# Patient Record
Sex: FEMALE | Race: WHITE | NOT HISPANIC OR LATINO | URBAN - METROPOLITAN AREA
[De-identification: names, ages, dates, MRNs, and addresses within clinical notes are randomized per-mention and may not be internally consistent; named-entity substitution may affect disease eponyms.]

---

## 2017-04-11 ENCOUNTER — IMPORTED ENCOUNTER (OUTPATIENT)
Dept: URBAN - METROPOLITAN AREA CLINIC 59 | Facility: CLINIC | Age: 77
End: 2017-04-11

## 2017-04-11 PROBLEM — H35.82 RETINAL ISCHEMIA: Noted: 2017-04-11

## 2017-04-11 PROBLEM — H43.12 VITREOUS HEMORRHAGE, LEFT EYE: Noted: 2017-04-11

## 2017-04-11 PROBLEM — H35.373 PUCKERING OF MACULA, BILATERAL: Noted: 2017-04-11

## 2017-04-11 PROBLEM — E11.3513 TYPE 2 DIABETES W/ PROLIFERATIVE DIABETIC RETINOPATHY W/ MACULAR EDEMA OF BILATERAL EYES: Noted: 2017-04-11

## 2017-04-11 PROCEDURE — 92014 COMPRE OPH EXAM EST PT 1/>: CPT

## 2017-04-11 PROCEDURE — 92235 FLUORESCEIN ANGRPH MLTIFRAME: CPT

## 2017-04-11 PROCEDURE — 92134 CPTRZ OPH DX IMG PST SGM RTA: CPT

## 2018-02-14 ENCOUNTER — FOLLOW UP (OUTPATIENT)
Age: 78
End: 2018-02-14

## 2018-02-14 DIAGNOSIS — Z96.1: ICD-10-CM

## 2018-02-14 DIAGNOSIS — E11.3211: ICD-10-CM

## 2018-02-14 DIAGNOSIS — E11.3512: ICD-10-CM

## 2018-02-14 PROCEDURE — 92250 FUNDUS PHOTOGRAPHY W/I&R: CPT

## 2018-02-14 PROCEDURE — 92014 COMPRE OPH EXAM EST PT 1/>: CPT

## 2018-02-14 PROCEDURE — 92134 CPTRZ OPH DX IMG PST SGM RTA: CPT

## 2018-02-14 ASSESSMENT — TONOMETRY
OS_IOP_MMHG: 20
OD_IOP_MMHG: 16

## 2018-02-14 ASSESSMENT — VISUAL ACUITY
OD_SC: 20/20-2
OS_SC: 20/30-2

## 2018-04-24 ENCOUNTER — UNSCHEDULED FOLLOW UP (OUTPATIENT)
Age: 78
End: 2018-04-24

## 2018-04-24 DIAGNOSIS — H43.12: ICD-10-CM

## 2018-04-24 DIAGNOSIS — E11.3211: ICD-10-CM

## 2018-04-24 DIAGNOSIS — E11.3512: ICD-10-CM

## 2018-04-24 PROCEDURE — 92014 COMPRE OPH EXAM EST PT 1/>: CPT

## 2018-04-24 ASSESSMENT — VISUAL ACUITY
OS_SC: CF 2FT
OD_SC: 20/20-2

## 2018-04-24 ASSESSMENT — TONOMETRY
OD_IOP_MMHG: 12
OS_IOP_MMHG: 14

## 2018-05-09 ENCOUNTER — FOLLOW UP (OUTPATIENT)
Age: 78
End: 2018-05-09

## 2018-05-09 DIAGNOSIS — E11.3211: ICD-10-CM

## 2018-05-09 DIAGNOSIS — E11.3512: ICD-10-CM

## 2018-05-09 DIAGNOSIS — H43.12: ICD-10-CM

## 2018-05-09 PROCEDURE — 92012 INTRM OPH EXAM EST PATIENT: CPT

## 2018-05-09 PROCEDURE — 76512 OPH US DX B-SCAN: CPT

## 2018-05-09 ASSESSMENT — TONOMETRY
OD_IOP_MMHG: 15
OS_IOP_MMHG: 14

## 2018-05-09 ASSESSMENT — VISUAL ACUITY
OS_SC: 20/150
OD_SC: 20/20-2

## 2018-06-13 ENCOUNTER — FOLLOW UP (OUTPATIENT)
Age: 78
End: 2018-06-13

## 2018-06-13 DIAGNOSIS — H43.12: ICD-10-CM

## 2018-06-13 DIAGNOSIS — E11.3512: ICD-10-CM

## 2018-06-13 DIAGNOSIS — E11.3211: ICD-10-CM

## 2018-06-13 PROCEDURE — 92014 COMPRE OPH EXAM EST PT 1/>: CPT

## 2018-06-13 ASSESSMENT — VISUAL ACUITY: OS_SC: 20/70-1

## 2018-06-13 ASSESSMENT — TONOMETRY: OS_IOP_MMHG: 14

## 2018-07-31 ENCOUNTER — FOLLOW UP (OUTPATIENT)
Age: 78
End: 2018-07-31

## 2018-07-31 DIAGNOSIS — E11.3512: ICD-10-CM

## 2018-07-31 DIAGNOSIS — H43.12: ICD-10-CM

## 2018-07-31 DIAGNOSIS — E11.3211: ICD-10-CM

## 2018-07-31 PROCEDURE — 92134 CPTRZ OPH DX IMG PST SGM RTA: CPT

## 2018-07-31 PROCEDURE — 92012 INTRM OPH EXAM EST PATIENT: CPT

## 2018-07-31 ASSESSMENT — VISUAL ACUITY
OD_SC: 20/20-1
OS_SC: 20/40-2

## 2018-07-31 ASSESSMENT — TONOMETRY
OS_IOP_MMHG: 15
OD_IOP_MMHG: 13

## 2018-08-22 ENCOUNTER — UNSCHEDULED FOLLOW UP (OUTPATIENT)
Age: 78
End: 2018-08-22

## 2018-08-22 DIAGNOSIS — E11.3211: ICD-10-CM

## 2018-08-22 DIAGNOSIS — H43.12: ICD-10-CM

## 2018-08-22 DIAGNOSIS — E11.3512: ICD-10-CM

## 2018-08-22 DIAGNOSIS — Z96.1: ICD-10-CM

## 2018-08-22 PROCEDURE — 67028 INJECTION EYE DRUG: CPT

## 2018-08-22 PROCEDURE — 92014 COMPRE OPH EXAM EST PT 1/>: CPT | Mod: 25

## 2018-08-22 ASSESSMENT — VISUAL ACUITY
OS_SC: CF 1FT
OD_SC: 20/25+2

## 2018-08-22 ASSESSMENT — TONOMETRY: OS_IOP_MMHG: 13

## 2018-10-02 ENCOUNTER — FOLLOW UP (OUTPATIENT)
Age: 78
End: 2018-10-02

## 2018-10-02 DIAGNOSIS — E11.3512: ICD-10-CM

## 2018-10-02 DIAGNOSIS — Z96.1: ICD-10-CM

## 2018-10-02 DIAGNOSIS — H43.12: ICD-10-CM

## 2018-10-02 DIAGNOSIS — E11.3211: ICD-10-CM

## 2018-10-02 PROCEDURE — 92134 CPTRZ OPH DX IMG PST SGM RTA: CPT

## 2018-10-02 PROCEDURE — 67028 INJECTION EYE DRUG: CPT

## 2018-10-02 PROCEDURE — 92012 INTRM OPH EXAM EST PATIENT: CPT | Mod: 25

## 2018-10-02 ASSESSMENT — VISUAL ACUITY
OD_SC: 20/20+2
OS_SC: 20/100

## 2018-10-02 ASSESSMENT — TONOMETRY
OS_IOP_MMHG: 19
OD_IOP_MMHG: 15

## 2018-11-14 ENCOUNTER — FOLLOW UP (OUTPATIENT)
Age: 78
End: 2018-11-14

## 2018-11-14 DIAGNOSIS — E11.3512: ICD-10-CM

## 2018-11-14 DIAGNOSIS — E11.3211: ICD-10-CM

## 2018-11-14 DIAGNOSIS — H43.12: ICD-10-CM

## 2018-11-14 PROCEDURE — 92134 CPTRZ OPH DX IMG PST SGM RTA: CPT

## 2018-11-14 PROCEDURE — 92014 COMPRE OPH EXAM EST PT 1/>: CPT

## 2018-11-14 ASSESSMENT — VISUAL ACUITY
OS_SC: 20/30+1
OD_SC: 20/20-2

## 2018-11-14 ASSESSMENT — TONOMETRY
OD_IOP_MMHG: 17
OS_IOP_MMHG: 15

## 2019-01-22 ENCOUNTER — FOLLOW UP (OUTPATIENT)
Age: 79
End: 2019-01-22

## 2019-01-22 DIAGNOSIS — E11.3512: ICD-10-CM

## 2019-01-22 DIAGNOSIS — H43.12: ICD-10-CM

## 2019-01-22 DIAGNOSIS — E11.3211: ICD-10-CM

## 2019-01-22 PROCEDURE — 92134 CPTRZ OPH DX IMG PST SGM RTA: CPT

## 2019-01-22 PROCEDURE — 92014 COMPRE OPH EXAM EST PT 1/>: CPT

## 2019-01-22 ASSESSMENT — TONOMETRY
OS_IOP_MMHG: 17
OD_IOP_MMHG: 18

## 2019-01-22 ASSESSMENT — VISUAL ACUITY
OD_SC: 20/25+3
OS_SC: 20/30+1

## 2019-02-19 ENCOUNTER — FOLLOW UP (OUTPATIENT)
Age: 79
End: 2019-02-19

## 2019-02-19 DIAGNOSIS — E11.3211: ICD-10-CM

## 2019-02-19 DIAGNOSIS — H43.12: ICD-10-CM

## 2019-02-19 DIAGNOSIS — E11.3512: ICD-10-CM

## 2019-02-19 PROCEDURE — 92012 INTRM OPH EXAM EST PATIENT: CPT | Mod: 25

## 2019-02-19 PROCEDURE — 67228 TREATMENT X10SV RETINOPATHY: CPT

## 2019-02-19 PROCEDURE — 92134 CPTRZ OPH DX IMG PST SGM RTA: CPT

## 2019-02-19 ASSESSMENT — VISUAL ACUITY
OS_SC: 20/30+2
OD_SC: 20/20

## 2019-02-19 ASSESSMENT — TONOMETRY
OD_IOP_MMHG: 16
OS_IOP_MMHG: 17

## 2019-04-16 ENCOUNTER — FOLLOW UP (OUTPATIENT)
Age: 79
End: 2019-04-16

## 2019-04-16 DIAGNOSIS — E11.3211: ICD-10-CM

## 2019-04-16 DIAGNOSIS — H43.12: ICD-10-CM

## 2019-04-16 DIAGNOSIS — E11.3512: ICD-10-CM

## 2019-04-16 PROCEDURE — 92014 COMPRE OPH EXAM EST PT 1/>: CPT

## 2019-04-16 PROCEDURE — 92134 CPTRZ OPH DX IMG PST SGM RTA: CPT

## 2019-04-16 ASSESSMENT — TONOMETRY
OS_IOP_MMHG: 15
OD_IOP_MMHG: 14

## 2019-04-16 ASSESSMENT — VISUAL ACUITY
OS_SC: 20/30-1
OD_SC: 20/20-2

## 2019-07-12 ENCOUNTER — FOLLOW UP (OUTPATIENT)
Age: 79
End: 2019-07-12

## 2019-07-12 DIAGNOSIS — E11.3512: ICD-10-CM

## 2019-07-12 DIAGNOSIS — E11.3211: ICD-10-CM

## 2019-07-12 DIAGNOSIS — H43.12: ICD-10-CM

## 2019-07-12 PROCEDURE — 92014 COMPRE OPH EXAM EST PT 1/>: CPT

## 2019-07-12 PROCEDURE — 92134 CPTRZ OPH DX IMG PST SGM RTA: CPT

## 2019-07-12 ASSESSMENT — TONOMETRY
OD_IOP_MMHG: 14
OS_IOP_MMHG: 17

## 2019-07-12 ASSESSMENT — VISUAL ACUITY
OS_SC: 20/30-2
OD_SC: 20/20-2

## 2019-11-01 ENCOUNTER — FOLLOW UP (OUTPATIENT)
Age: 79
End: 2019-11-01

## 2019-11-01 DIAGNOSIS — E11.3512: ICD-10-CM

## 2019-11-01 DIAGNOSIS — H43.12: ICD-10-CM

## 2019-11-01 DIAGNOSIS — Z96.1: ICD-10-CM

## 2019-11-01 DIAGNOSIS — E11.3211: ICD-10-CM

## 2019-11-01 PROCEDURE — 92134 CPTRZ OPH DX IMG PST SGM RTA: CPT

## 2019-11-01 PROCEDURE — 92014 COMPRE OPH EXAM EST PT 1/>: CPT

## 2019-11-01 ASSESSMENT — VISUAL ACUITY
OD_SC: 20/20
OS_SC: 20/30-2

## 2019-11-01 ASSESSMENT — TONOMETRY
OS_IOP_MMHG: 23
OD_IOP_MMHG: 19

## 2020-02-21 ENCOUNTER — FOLLOW UP (OUTPATIENT)
Age: 80
End: 2020-02-21

## 2020-02-21 DIAGNOSIS — H43.12: ICD-10-CM

## 2020-02-21 DIAGNOSIS — E11.3512: ICD-10-CM

## 2020-02-21 DIAGNOSIS — E11.3211: ICD-10-CM

## 2020-02-21 PROCEDURE — 92134 CPTRZ OPH DX IMG PST SGM RTA: CPT

## 2020-02-21 PROCEDURE — 92014 COMPRE OPH EXAM EST PT 1/>: CPT

## 2020-02-21 ASSESSMENT — VISUAL ACUITY
OS_SC: 20/25
OD_SC: 20/20

## 2020-02-21 ASSESSMENT — TONOMETRY
OS_IOP_MMHG: 14
OD_IOP_MMHG: 14

## 2020-07-22 ENCOUNTER — FOLLOW UP (OUTPATIENT)
Age: 80
End: 2020-07-22

## 2020-07-22 DIAGNOSIS — E11.3211: ICD-10-CM

## 2020-07-22 DIAGNOSIS — H43.12: ICD-10-CM

## 2020-07-22 DIAGNOSIS — E11.3512: ICD-10-CM

## 2020-07-22 PROCEDURE — 92134 CPTRZ OPH DX IMG PST SGM RTA: CPT

## 2020-07-22 PROCEDURE — 92014 COMPRE OPH EXAM EST PT 1/>: CPT

## 2020-07-22 ASSESSMENT — TONOMETRY
OD_IOP_MMHG: 10
OS_IOP_MMHG: 12

## 2020-07-22 ASSESSMENT — VISUAL ACUITY
OS_SC: 20/25-1
OD_SC: 20/20-1

## 2021-04-27 ENCOUNTER — FOLLOW UP (OUTPATIENT)
Age: 81
End: 2021-04-27

## 2021-04-27 DIAGNOSIS — E11.3512: ICD-10-CM

## 2021-04-27 DIAGNOSIS — H43.12: ICD-10-CM

## 2021-04-27 DIAGNOSIS — E11.3211: ICD-10-CM

## 2021-04-27 PROCEDURE — 92014 COMPRE OPH EXAM EST PT 1/>: CPT

## 2021-04-27 PROCEDURE — 92134 CPTRZ OPH DX IMG PST SGM RTA: CPT

## 2021-04-27 ASSESSMENT — VISUAL ACUITY
OD_SC: 20/20
OS_SC: 20/40

## 2021-04-27 ASSESSMENT — TONOMETRY
OD_IOP_MMHG: 17
OS_IOP_MMHG: 15

## 2021-12-14 ENCOUNTER — APPOINTMENT (RX ONLY)
Dept: URBAN - METROPOLITAN AREA CLINIC 348 | Facility: CLINIC | Age: 81
Setting detail: DERMATOLOGY
End: 2021-12-14

## 2021-12-14 DIAGNOSIS — L82.0 INFLAMED SEBORRHEIC KERATOSIS: ICD-10-CM

## 2021-12-14 DIAGNOSIS — L82.1 OTHER SEBORRHEIC KERATOSIS: ICD-10-CM

## 2021-12-14 DIAGNOSIS — L81.4 OTHER MELANIN HYPERPIGMENTATION: ICD-10-CM

## 2021-12-14 PROCEDURE — ? LIQUID NITROGEN

## 2021-12-14 PROCEDURE — 17110 DESTRUCTION B9 LES UP TO 14: CPT

## 2021-12-14 PROCEDURE — ? COUNSELING

## 2021-12-14 PROCEDURE — 99203 OFFICE O/P NEW LOW 30 MIN: CPT | Mod: 25

## 2021-12-14 ASSESSMENT — LOCATION DETAILED DESCRIPTION DERM
LOCATION DETAILED: PERIUMBILICAL SKIN
LOCATION DETAILED: RIGHT SUPERIOR UPPER BACK
LOCATION DETAILED: RIGHT INFERIOR LATERAL MIDBACK
LOCATION DETAILED: LEFT INFERIOR UPPER BACK

## 2021-12-14 ASSESSMENT — LOCATION SIMPLE DESCRIPTION DERM
LOCATION SIMPLE: LEFT UPPER BACK
LOCATION SIMPLE: RIGHT LOWER BACK
LOCATION SIMPLE: RIGHT UPPER BACK
LOCATION SIMPLE: ABDOMEN

## 2021-12-14 ASSESSMENT — LOCATION ZONE DERM: LOCATION ZONE: TRUNK

## 2021-12-14 NOTE — PROCEDURE: LIQUID NITROGEN
Show Applicator Variable?: Yes
Include Z78.9 (Other Specified Conditions Influencing Health Status) As An Associated Diagnosis?: No
Detail Level: Detailed
Medical Necessity Clause: This procedure was medically necessary because the lesions that were treated were:
Post-Care Instructions: I reviewed with the patient in detail post-care instructions. Patient is to wear sunprotection, and avoid picking at any of the treated lesions. Pt may apply Vaseline to crusted or scabbing areas.
Medical Necessity Information: It is in your best interest to select a reason for this procedure from the list below. All of these items fulfill various CMS LCD requirements except the new and changing color options.
Consent: The patient's consent was obtained including but not limited to risks of crusting, scabbing, blistering, scarring, darker or lighter pigmentary change, recurrence, incomplete removal and infection.

## 2022-02-16 ENCOUNTER — APPOINTMENT (RX ONLY)
Dept: URBAN - METROPOLITAN AREA CLINIC 341 | Facility: CLINIC | Age: 82
Setting detail: DERMATOLOGY
End: 2022-02-16

## 2022-02-16 DIAGNOSIS — L82.0 INFLAMED SEBORRHEIC KERATOSIS: ICD-10-CM

## 2022-02-16 PROCEDURE — ? LIQUID NITROGEN

## 2022-02-16 PROCEDURE — ? COUNSELING

## 2022-02-16 PROCEDURE — 17110 DESTRUCTION B9 LES UP TO 14: CPT

## 2022-02-16 ASSESSMENT — LOCATION SIMPLE DESCRIPTION DERM: LOCATION SIMPLE: ABDOMEN

## 2022-02-16 ASSESSMENT — LOCATION ZONE DERM: LOCATION ZONE: TRUNK

## 2022-02-16 ASSESSMENT — LOCATION DETAILED DESCRIPTION DERM: LOCATION DETAILED: PERIUMBILICAL SKIN

## 2022-02-16 NOTE — PROCEDURE: LIQUID NITROGEN
Add 52 Modifier (Optional): no
Show Applicator Variable?: Yes
Medical Necessity Information: It is in your best interest to select a reason for this procedure from the list below. All of these items fulfill various CMS LCD requirements except the new and changing color options.
Consent: The patient's consent was obtained including but not limited to risks of crusting, scabbing, blistering, scarring, darker or lighter pigmentary change, recurrence, incomplete removal and infection.
Medical Necessity Clause: This procedure was medically necessary because the lesions that were treated were:
Post-Care Instructions: I reviewed with the patient in detail post-care instructions. Patient is to wear sunprotection, and avoid picking at any of the treated lesions. Pt may apply Vaseline to crusted or scabbing areas.
Detail Level: Detailed
Spray Paint Text: The liquid nitrogen was applied to the skin utilizing a spray paint frosting technique.

## 2022-05-10 ENCOUNTER — UNSCHEDULED FOLLOW UP (OUTPATIENT)
Dept: URBAN - METROPOLITAN AREA CLINIC 78 | Facility: CLINIC | Age: 82
End: 2022-05-10

## 2022-05-10 DIAGNOSIS — E11.3211: ICD-10-CM

## 2022-05-10 DIAGNOSIS — H43.12: ICD-10-CM

## 2022-05-10 DIAGNOSIS — E11.3512: ICD-10-CM

## 2022-05-10 PROCEDURE — 99214 OFFICE O/P EST MOD 30 MIN: CPT | Mod: 25

## 2022-05-10 PROCEDURE — 67028 INJECTION EYE DRUG: CPT

## 2022-05-10 PROCEDURE — 92134 CPTRZ OPH DX IMG PST SGM RTA: CPT

## 2022-05-10 ASSESSMENT — TONOMETRY
OD_IOP_MMHG: 12
OS_IOP_MMHG: 14

## 2022-05-10 ASSESSMENT — VISUAL ACUITY
OD_SC: 20/20-2
OS_SC: CF 2FT

## 2022-06-24 ENCOUNTER — FOLLOW UP (OUTPATIENT)
Dept: URBAN - METROPOLITAN AREA CLINIC 78 | Facility: CLINIC | Age: 82
End: 2022-06-24

## 2022-06-24 DIAGNOSIS — E11.3512: ICD-10-CM

## 2022-06-24 DIAGNOSIS — H43.12: ICD-10-CM

## 2022-06-24 DIAGNOSIS — E11.3211: ICD-10-CM

## 2022-06-24 PROCEDURE — 92014 COMPRE OPH EXAM EST PT 1/>: CPT | Mod: 25

## 2022-06-24 PROCEDURE — 67028 INJECTION EYE DRUG: CPT

## 2022-06-24 PROCEDURE — 92134 CPTRZ OPH DX IMG PST SGM RTA: CPT

## 2022-06-24 ASSESSMENT — TONOMETRY
OD_IOP_MMHG: 11
OS_IOP_MMHG: 13

## 2022-06-24 ASSESSMENT — VISUAL ACUITY
OD_SC: 20/20-2
OS_SC: CF 2FT

## 2022-07-29 ENCOUNTER — FOLLOW UP (OUTPATIENT)
Dept: URBAN - METROPOLITAN AREA CLINIC 78 | Facility: CLINIC | Age: 82
End: 2022-07-29

## 2022-07-29 DIAGNOSIS — E11.3211: ICD-10-CM

## 2022-07-29 DIAGNOSIS — E11.3512: ICD-10-CM

## 2022-07-29 DIAGNOSIS — H43.12: ICD-10-CM

## 2022-07-29 PROCEDURE — 99213 OFFICE O/P EST LOW 20 MIN: CPT

## 2022-07-29 ASSESSMENT — VISUAL ACUITY: OD_CC: 20/20-1

## 2022-07-29 ASSESSMENT — TONOMETRY
OD_IOP_MMHG: 19
OS_IOP_MMHG: 17

## 2022-08-26 ENCOUNTER — FOLLOW UP (OUTPATIENT)
Dept: URBAN - METROPOLITAN AREA CLINIC 78 | Facility: CLINIC | Age: 82
End: 2022-08-26

## 2022-08-26 DIAGNOSIS — H43.12: ICD-10-CM

## 2022-08-26 DIAGNOSIS — E11.3512: ICD-10-CM

## 2022-08-26 DIAGNOSIS — E11.3211: ICD-10-CM

## 2022-08-26 PROCEDURE — 92014 COMPRE OPH EXAM EST PT 1/>: CPT | Mod: 25

## 2022-08-26 PROCEDURE — 92134 CPTRZ OPH DX IMG PST SGM RTA: CPT

## 2022-08-26 PROCEDURE — 67028 INJECTION EYE DRUG: CPT

## 2022-08-26 ASSESSMENT — TONOMETRY
OD_IOP_MMHG: 14
OS_IOP_MMHG: 15

## 2022-08-26 ASSESSMENT — VISUAL ACUITY
OS_SC: 20/200
OD_SC: 20/20

## 2022-10-04 ENCOUNTER — FOLLOW UP (OUTPATIENT)
Dept: URBAN - METROPOLITAN AREA CLINIC 78 | Facility: CLINIC | Age: 82
End: 2022-10-04

## 2022-10-04 DIAGNOSIS — E11.3512: ICD-10-CM

## 2022-10-04 DIAGNOSIS — H43.12: ICD-10-CM

## 2022-10-04 DIAGNOSIS — E11.3211: ICD-10-CM

## 2022-10-04 DIAGNOSIS — Z96.1: ICD-10-CM

## 2022-10-04 PROCEDURE — 67028 INJECTION EYE DRUG: CPT

## 2022-10-04 PROCEDURE — 99214 OFFICE O/P EST MOD 30 MIN: CPT | Mod: 25

## 2022-10-04 PROCEDURE — 92134 CPTRZ OPH DX IMG PST SGM RTA: CPT

## 2022-10-04 ASSESSMENT — VISUAL ACUITY
OS_SC: 20/40-2
OD_SC: 20/20-1

## 2022-10-04 ASSESSMENT — TONOMETRY
OD_IOP_MMHG: 13
OS_IOP_MMHG: 13

## 2022-11-15 ENCOUNTER — FOLLOW UP (OUTPATIENT)
Dept: URBAN - METROPOLITAN AREA CLINIC 78 | Facility: CLINIC | Age: 82
End: 2022-11-15

## 2022-11-15 DIAGNOSIS — H43.12: ICD-10-CM

## 2022-11-15 DIAGNOSIS — E11.3512: ICD-10-CM

## 2022-11-15 DIAGNOSIS — E11.3211: ICD-10-CM

## 2022-11-15 PROCEDURE — 92014 COMPRE OPH EXAM EST PT 1/>: CPT | Mod: 25

## 2022-11-15 PROCEDURE — 67028 INJECTION EYE DRUG: CPT

## 2022-11-15 PROCEDURE — 92134 CPTRZ OPH DX IMG PST SGM RTA: CPT

## 2022-11-15 ASSESSMENT — VISUAL ACUITY
OD_SC: 20/20-2
OS_SC: 20/40-1

## 2022-11-15 ASSESSMENT — TONOMETRY
OS_IOP_MMHG: 09
OD_IOP_MMHG: 09

## 2023-01-10 ENCOUNTER — FOLLOW UP (OUTPATIENT)
Dept: URBAN - METROPOLITAN AREA CLINIC 78 | Facility: CLINIC | Age: 83
End: 2023-01-10

## 2023-01-10 DIAGNOSIS — E11.3512: ICD-10-CM

## 2023-01-10 DIAGNOSIS — E11.3211: ICD-10-CM

## 2023-01-10 DIAGNOSIS — H43.12: ICD-10-CM

## 2023-01-10 PROCEDURE — 92014 COMPRE OPH EXAM EST PT 1/>: CPT

## 2023-01-10 PROCEDURE — 92134 CPTRZ OPH DX IMG PST SGM RTA: CPT

## 2023-01-10 ASSESSMENT — VISUAL ACUITY
OS_SC: 20/30-1
OD_SC: 20/20-1

## 2023-01-10 ASSESSMENT — TONOMETRY
OS_IOP_MMHG: 17
OD_IOP_MMHG: 14

## 2023-03-03 ENCOUNTER — FOLLOW UP (OUTPATIENT)
Dept: URBAN - METROPOLITAN AREA CLINIC 78 | Facility: CLINIC | Age: 83
End: 2023-03-03

## 2023-03-03 DIAGNOSIS — H43.12: ICD-10-CM

## 2023-03-03 DIAGNOSIS — E11.3211: ICD-10-CM

## 2023-03-03 DIAGNOSIS — E11.3512: ICD-10-CM

## 2023-03-03 PROCEDURE — 92014 COMPRE OPH EXAM EST PT 1/>: CPT

## 2023-03-03 PROCEDURE — 92134 CPTRZ OPH DX IMG PST SGM RTA: CPT

## 2023-03-03 ASSESSMENT — TONOMETRY
OD_IOP_MMHG: 10
OS_IOP_MMHG: 12

## 2023-03-03 ASSESSMENT — VISUAL ACUITY
OS_SC: 20/30-1
OD_SC: 20/25-1

## 2023-03-13 ENCOUNTER — APPOINTMENT (RX ONLY)
Dept: URBAN - METROPOLITAN AREA CLINIC 341 | Facility: CLINIC | Age: 83
Setting detail: DERMATOLOGY
End: 2023-03-13

## 2023-03-13 DIAGNOSIS — L72.0 EPIDERMAL CYST: ICD-10-CM | Status: INADEQUATELY CONTROLLED

## 2023-03-13 PROCEDURE — ? COUNSELING

## 2023-03-13 PROCEDURE — 99212 OFFICE O/P EST SF 10 MIN: CPT

## 2023-03-13 ASSESSMENT — LOCATION ZONE DERM: LOCATION ZONE: NECK

## 2023-03-13 ASSESSMENT — LOCATION SIMPLE DESCRIPTION DERM: LOCATION SIMPLE: NECK

## 2023-03-13 ASSESSMENT — LOCATION DETAILED DESCRIPTION DERM: LOCATION DETAILED: RIGHT CENTRAL LATERAL NECK

## 2023-05-05 ENCOUNTER — FOLLOW UP (OUTPATIENT)
Dept: URBAN - METROPOLITAN AREA CLINIC 78 | Facility: CLINIC | Age: 83
End: 2023-05-05

## 2023-05-05 DIAGNOSIS — E11.3512: ICD-10-CM

## 2023-05-05 DIAGNOSIS — H43.12: ICD-10-CM

## 2023-05-05 DIAGNOSIS — E11.3211: ICD-10-CM

## 2023-05-05 PROCEDURE — 92014 COMPRE OPH EXAM EST PT 1/>: CPT

## 2023-05-05 PROCEDURE — 92134 CPTRZ OPH DX IMG PST SGM RTA: CPT

## 2023-05-05 ASSESSMENT — TONOMETRY
OS_IOP_MMHG: 12
OD_IOP_MMHG: 14

## 2023-05-05 ASSESSMENT — VISUAL ACUITY
OS_SC: 20/30-2
OD_SC: 20/20-2

## 2023-07-21 ENCOUNTER — FOLLOW UP (OUTPATIENT)
Dept: URBAN - METROPOLITAN AREA CLINIC 78 | Facility: CLINIC | Age: 83
End: 2023-07-21

## 2023-07-21 DIAGNOSIS — H43.12: ICD-10-CM

## 2023-07-21 DIAGNOSIS — E11.3512: ICD-10-CM

## 2023-07-21 DIAGNOSIS — E11.3211: ICD-10-CM

## 2023-07-21 PROCEDURE — 92134 CPTRZ OPH DX IMG PST SGM RTA: CPT

## 2023-07-21 PROCEDURE — 92014 COMPRE OPH EXAM EST PT 1/>: CPT

## 2023-07-21 ASSESSMENT — TONOMETRY
OD_IOP_MMHG: 18
OS_IOP_MMHG: 17

## 2023-07-21 ASSESSMENT — VISUAL ACUITY
OD_CC: 20/20-2
OS_CC: 20/40

## 2023-10-15 ASSESSMENT — VISUAL ACUITY
OS_SC: 20/30-2
OD_SC: 20/20-2

## 2023-10-15 ASSESSMENT — TONOMETRY
OS_IOP_MMHG: 14
OD_IOP_MMHG: 12

## 2023-11-17 ENCOUNTER — FOLLOW UP (OUTPATIENT)
Dept: URBAN - METROPOLITAN AREA CLINIC 78 | Facility: CLINIC | Age: 83
End: 2023-11-17

## 2023-11-17 DIAGNOSIS — E11.3512: ICD-10-CM

## 2023-11-17 DIAGNOSIS — E11.3211: ICD-10-CM

## 2023-11-17 DIAGNOSIS — H43.12: ICD-10-CM

## 2023-11-17 PROCEDURE — 92134 CPTRZ OPH DX IMG PST SGM RTA: CPT

## 2023-11-17 PROCEDURE — 92014 COMPRE OPH EXAM EST PT 1/>: CPT

## 2023-11-17 ASSESSMENT — TONOMETRY
OD_IOP_MMHG: 15
OS_IOP_MMHG: 13

## 2023-11-17 ASSESSMENT — VISUAL ACUITY
OS_SC: 20/30-1
OD_SC: 20/20-1

## 2024-03-08 ENCOUNTER — FOLLOW UP (OUTPATIENT)
Dept: URBAN - METROPOLITAN AREA CLINIC 78 | Facility: CLINIC | Age: 84
End: 2024-03-08

## 2024-03-08 DIAGNOSIS — H43.12: ICD-10-CM

## 2024-03-08 DIAGNOSIS — E11.3512: ICD-10-CM

## 2024-03-08 DIAGNOSIS — E11.3211: ICD-10-CM

## 2024-03-08 PROCEDURE — 92014 COMPRE OPH EXAM EST PT 1/>: CPT

## 2024-03-08 PROCEDURE — 92134 CPTRZ OPH DX IMG PST SGM RTA: CPT | Mod: NC

## 2024-03-08 PROCEDURE — 92250 FUNDUS PHOTOGRAPHY W/I&R: CPT

## 2024-03-08 ASSESSMENT — VISUAL ACUITY
OS_SC: 20/40
OD_SC: 20/20-2
OS_PH: 20/30

## 2024-03-08 ASSESSMENT — TONOMETRY
OD_IOP_MMHG: 18
OS_IOP_MMHG: 17

## 2024-09-13 ENCOUNTER — FOLLOW UP (OUTPATIENT)
Dept: URBAN - METROPOLITAN AREA CLINIC 78 | Facility: CLINIC | Age: 84
End: 2024-09-13

## 2024-09-13 DIAGNOSIS — E11.3512: ICD-10-CM

## 2024-09-13 DIAGNOSIS — E11.3211: ICD-10-CM

## 2024-09-13 PROCEDURE — 92014 COMPRE OPH EXAM EST PT 1/>: CPT

## 2024-09-13 PROCEDURE — 92134 CPTRZ OPH DX IMG PST SGM RTA: CPT

## 2024-09-13 ASSESSMENT — VISUAL ACUITY
OS_SC: 20/40-1
OD_SC: 20/25

## 2024-09-13 ASSESSMENT — TONOMETRY
OD_IOP_MMHG: 14
OS_IOP_MMHG: 19

## 2025-03-21 ENCOUNTER — FOLLOW UP (OUTPATIENT)
Dept: URBAN - METROPOLITAN AREA CLINIC 78 | Facility: CLINIC | Age: 85
End: 2025-03-21

## 2025-03-21 DIAGNOSIS — E11.3211: ICD-10-CM

## 2025-03-21 DIAGNOSIS — E11.3512: ICD-10-CM

## 2025-03-21 PROCEDURE — 92134 CPTRZ OPH DX IMG PST SGM RTA: CPT | Mod: NC

## 2025-03-21 PROCEDURE — 92014 COMPRE OPH EXAM EST PT 1/>: CPT

## 2025-03-21 PROCEDURE — 92250 FUNDUS PHOTOGRAPHY W/I&R: CPT

## 2025-03-21 ASSESSMENT — VISUAL ACUITY
OS_SC: 20/30-2
OD_SC: 20/20-1

## 2025-03-21 ASSESSMENT — TONOMETRY
OS_IOP_MMHG: 17
OD_IOP_MMHG: 19